# Patient Record
Sex: MALE | URBAN - METROPOLITAN AREA
[De-identification: names, ages, dates, MRNs, and addresses within clinical notes are randomized per-mention and may not be internally consistent; named-entity substitution may affect disease eponyms.]

---

## 2022-04-19 ENCOUNTER — APPOINTMENT (RX ONLY)
Dept: URBAN - METROPOLITAN AREA CLINIC 83 | Facility: CLINIC | Age: 32
Setting detail: DERMATOLOGY
End: 2022-04-19

## 2022-04-19 DIAGNOSIS — L82.1 OTHER SEBORRHEIC KERATOSIS: ICD-10-CM

## 2022-04-19 DIAGNOSIS — D22 MELANOCYTIC NEVI: ICD-10-CM

## 2022-04-19 PROBLEM — D22.5 MELANOCYTIC NEVI OF TRUNK: Status: ACTIVE | Noted: 2022-04-19

## 2022-04-19 PROCEDURE — 99203 OFFICE O/P NEW LOW 30 MIN: CPT

## 2022-04-19 PROCEDURE — ? COUNSELING

## 2022-04-19 ASSESSMENT — LOCATION ZONE DERM: LOCATION ZONE: TRUNK

## 2022-04-19 ASSESSMENT — LOCATION DETAILED DESCRIPTION DERM
LOCATION DETAILED: LEFT INFERIOR LATERAL UPPER BACK
LOCATION DETAILED: LEFT MEDIAL INFERIOR CHEST
LOCATION DETAILED: LEFT MEDIAL UPPER BACK

## 2022-04-19 ASSESSMENT — LOCATION SIMPLE DESCRIPTION DERM
LOCATION SIMPLE: CHEST
LOCATION SIMPLE: LEFT UPPER BACK

## 2022-04-19 NOTE — PROCEDURE: MIPS QUALITY
Quality 130: Documentation Of Current Medications In The Medical Record: Current Medications Documented
Quality 110: Preventive Care And Screening: Influenza Immunization: Influenza Immunization not Administered because Patient Refused.
Additional Notes: Patient refused
Detail Level: Detailed

## 2022-04-19 NOTE — HPI: EVALUATION OF SKIN LESION(S)
What Type Of Note Output Would You Prefer (Optional)?: Standard Output
How Severe Are Your Spot(S)?: mild
Have Your Spot(S) Been Treated In The Past?: has not been treated
Hpi Title: Evaluation of a Skin Lesion
Additional History: Place on left rib/back present for weeks.